# Patient Record
Sex: FEMALE | Race: BLACK OR AFRICAN AMERICAN | NOT HISPANIC OR LATINO | Employment: FULL TIME | URBAN - METROPOLITAN AREA
[De-identification: names, ages, dates, MRNs, and addresses within clinical notes are randomized per-mention and may not be internally consistent; named-entity substitution may affect disease eponyms.]

---

## 2019-12-24 ENCOUNTER — HOSPITAL ENCOUNTER (EMERGENCY)
Facility: HOSPITAL | Age: 29
Discharge: HOME | End: 2019-12-24
Attending: EMERGENCY MEDICINE
Payer: COMMERCIAL

## 2019-12-24 VITALS
DIASTOLIC BLOOD PRESSURE: 92 MMHG | HEIGHT: 66 IN | HEART RATE: 89 BPM | SYSTOLIC BLOOD PRESSURE: 131 MMHG | RESPIRATION RATE: 18 BRPM | BODY MASS INDEX: 29.25 KG/M2 | WEIGHT: 182 LBS | TEMPERATURE: 98 F | OXYGEN SATURATION: 97 %

## 2019-12-24 DIAGNOSIS — M25.562 LEFT KNEE PAIN: ICD-10-CM

## 2019-12-24 DIAGNOSIS — S83.512A RUPTURE OF ANTERIOR CRUCIATE LIGAMENT OF LEFT KNEE, INITIAL ENCOUNTER: Primary | ICD-10-CM

## 2019-12-24 LAB
B-HCG UR QL: NEGATIVE
CTP QC/QA: YES

## 2019-12-24 PROCEDURE — 29505 APPLICATION LONG LEG SPLINT: CPT | Mod: LT

## 2019-12-24 PROCEDURE — 81025 URINE PREGNANCY TEST: CPT | Performed by: NURSE PRACTITIONER

## 2019-12-24 PROCEDURE — 99284 EMERGENCY DEPT VISIT MOD MDM: CPT | Mod: 25

## 2019-12-24 PROCEDURE — 25000003 PHARM REV CODE 250: Performed by: NURSE PRACTITIONER

## 2019-12-24 RX ORDER — OXYCODONE AND ACETAMINOPHEN 5; 325 MG/1; MG/1
1 TABLET ORAL
Status: DISCONTINUED | OUTPATIENT
Start: 2019-12-24 | End: 2019-12-24 | Stop reason: HOSPADM

## 2019-12-24 RX ORDER — IBUPROFEN 600 MG/1
600 TABLET ORAL EVERY 6 HOURS PRN
Qty: 20 TABLET | Refills: 0 | Status: SHIPPED | OUTPATIENT
Start: 2019-12-24

## 2019-12-24 RX ORDER — HYDROCODONE BITARTRATE AND ACETAMINOPHEN 5; 325 MG/1; MG/1
1 TABLET ORAL EVERY 4 HOURS PRN
Qty: 10 TABLET | Refills: 0 | Status: SHIPPED | OUTPATIENT
Start: 2019-12-24

## 2019-12-24 RX ORDER — NAPROXEN 500 MG/1
500 TABLET ORAL
Status: COMPLETED | OUTPATIENT
Start: 2019-12-24 | End: 2019-12-24

## 2019-12-24 RX ADMIN — NAPROXEN 500 MG: 500 TABLET ORAL at 01:12

## 2019-12-24 NOTE — DISCHARGE INSTRUCTIONS
You have been prescribed NORCO for pain. Please do not take this medication while working, drinking alcohol, swimming, or while driving/operating heavy machinery. This medication may cause drowsiness, impair judgment, and reduce physical capabilities.This medication contains Tylenol. Please do not take any additional Tylenol while you are taking this medication.      You have been prescribed ibuprofen for pain. This is an Non-Steroidal Anti-Inflammatory (NSAID) Medication. Please do not take any additional NSAIDs while you are taking this medication including (Advil, Aleve, Motrin, Ibuprofen, Mobic\meloxicam, Naprosyn, etc.). Please stop taking this medication if you experience: weakness, itching, yellow skin or eyes, joint pains, vomiting blood, blood or black stools, unusual weight gain, or swelling in your arms, legs, hands, or feet.     Please return to the Emergency Department for any new or worsening symptoms including: fever, chest pain, shortness of breath, loss of consciousness, dizziness, weakness, or any other concerns.     Please follow up with your Primary Care Provider within in the week. If you do not have one, you may contact the one listed on your discharge paperwork or you may also call the [facility] Appointment Desk at [phone] to schedule an appointment with one.     Please take all medication as prescribed.

## 2019-12-24 NOTE — ED PROVIDER NOTES
Encounter Date: 12/24/2019    SCRIBE #1 NOTE: I, Eliana Moreno, am scribing for, and in the presence of,  Doris Bassett NP. I have scribed the following portions of the note - Other sections scribed: EBONIE ASENCIO.       History     Chief Complaint   Patient presents with    Knee Pain     Pt c/o left knee pain after jumping on a trampoline today. Limited ROM d/t pain      29 year old female patient presents to the ED complaining of left knee pain status post jumping on a trampoline prior to arrival. The patient reports that she was landing from her jump when her left leg gave out on her. She reports that she did not attempt treatment. She denies any prior injuries to her left leg. She denies any significant past medical history. No other associated symptoms.    The history is provided by the patient.     Review of patient's allergies indicates:  No Known Allergies  History reviewed. No pertinent past medical history.  History reviewed. No pertinent surgical history.  History reviewed. No pertinent family history.  Social History     Tobacco Use    Smoking status: Never Smoker    Smokeless tobacco: Never Used   Substance Use Topics    Alcohol use: Yes     Frequency: Never     Comment: occass    Drug use: Never     Review of Systems   Constitutional: Negative for chills and fever.   HENT: Negative for sore throat.    Eyes: Negative for visual disturbance.   Respiratory: Negative for shortness of breath.    Cardiovascular: Negative for chest pain.   Gastrointestinal: Negative for abdominal pain, diarrhea, nausea and vomiting.   Genitourinary: Negative for dysuria.   Musculoskeletal: Positive for arthralgias (Left knee). Negative for neck pain.   Skin: Negative for rash.   Neurological: Negative for headaches.   Psychiatric/Behavioral: Negative for dysphoric mood.       Physical Exam     Initial Vitals [12/24/19 1228]   BP Pulse Resp Temp SpO2   (!) 141/97 98 18 98.9 °F (37.2 °C) 100 %      MAP       --          Physical Exam    Constitutional: She appears well-developed and well-nourished. She is not diaphoretic. No distress.   HENT:   Head: Normocephalic and atraumatic.   Neck: Normal range of motion.   Cardiovascular: Normal rate, regular rhythm, normal heart sounds and intact distal pulses.   Pulmonary/Chest: Breath sounds normal. No respiratory distress.   Musculoskeletal:        Left hip: Normal.        Left knee: She exhibits decreased range of motion and swelling. Tenderness found.        Left ankle: Normal.   Good distal pulses and capillary refill   Neurological: She is alert and oriented to person, place, and time.   Skin: Skin is warm and dry. Capillary refill takes less than 2 seconds.   Psychiatric: She has a normal mood and affect. Her behavior is normal.         ED Course   Procedures  Labs Reviewed   POCT URINE PREGNANCY          Imaging Results          CT Knee Without Contrast Left (Final result)  Result time 12/24/19 16:09:45    Final result by Errol Taylor Jr., MD (12/24/19 16:09:45)                 Impression:      Findings concerning for ACL injury.  Likely some edema posterolateral corner and perhaps a tiny avulsion fracture posterolateral tibial plateau.  There is also soft tissue stranding about the lateral collateral ligament and some fluid along the popliteus tendon.  Joint effusion noted.  Nonemergent MRI and orthopedic consultation suggested.      Electronically signed by: Errol Taylor MD  Date:    12/24/2019  Time:    16:09             Narrative:    EXAMINATION:  CT KNEE WITHOUT CONTRAST LEFT    CLINICAL HISTORY:  Knee trauma, xray tibial plateau fx, bone or soft tissue inj suspected;    TECHNIQUE:  CT of the left knee was performed.  1.25 mm axial as well as 2 mm sagittal and coronal reformatted images submitted.    COMPARISON:  Knee radiographs December 24.    FINDINGS:  The tibia and tibial plateau appear intact.  No convincing fracture seen.  There is a small effusion in the  suprapatellar bursa.  There is some soft tissue stranding in the region of the ACL.  Additionally there is some increased sclerosis posterolateral aspect of the tibia and perhaps a small avulsion at the posterolateral corner.  Additionally there is some soft tissue stranding about the lateral collateral ligament.  Some fluid likely extends along the popliteus tendon.  Findings concerning for ACL tear and associated injuries.  Nonemergent MRI and orthopedic consultation suggested.  Remainder of the bones are intact.  Intradepartmental consultation was obtained.                               X-Ray Knee 3 View Left (Final result)  Result time 12/24/19 14:10:28    Final result by Errol Taylor Jr., MD (12/24/19 14:10:28)                 Impression:      Questionable step-off along the medial tibial plateau.  This may merely be related to some rotation with tibial plateau fracture not excluded.  Correlation with clinical findings and additional images if indicated.      Electronically signed by: Errol Taylor MD  Date:    12/24/2019  Time:    14:10             Narrative:    EXAMINATION:  XR KNEE 3 VIEW LEFT    CLINICAL HISTORY:  Pain in left knee    TECHNIQUE:  AP, lateral, and Merchant views of the left knee were performed.    COMPARISON:  None    FINDINGS:  Bones are well mineralized.  Alignment is satisfactory.  May be a small effusion.  Questionable step-off about the medial tibial plateau.                                 Medical Decision Making:   ED Management:  This is a 29-year-old female presenting for evaluation of left knee pain on exam, there is swelling and generalized tenderness.  Good pulses and capillary refill.  X-ray with questionable step-off along the medial tibial plateau.  Cannot rule out tibial plateau fracture.  CT scan was obtained. Findings concerning for ACL injury with perhaps tiny avulsion fracture of the posterior lateral tibial plateau.  Patient remains neurovascularly intact.  Pain is  controlled.  She was placed in a knee immobilizer and nonweightbearing on crutches.  She is in town visiting from [place].  Patient was given imaging on is disc and instructed follow up as soon as possible with Ortho.  This case was discussed with my attending physician who examined the patient and agrees with my plan of care.            Scribe Attestation:   Scribe #1: I performed the above scribed service and the documentation accurately describes the services I performed. I attest to the accuracy of the note.                          Clinical Impression:       ICD-10-CM ICD-9-CM   1. Rupture of anterior cruciate ligament of left knee, initial encounter S83.512A 844.2   2. Left knee pain M25.562 719.46         Disposition:   Disposition: Discharged  Condition: Stable    SHAMAR ROMANO, personally performed the services described in this documentation. All medical record entries made by the scribe were at my direction and in my presence.  I have reviewed the chart and agree that the record reflects my personal performance and is accurate and complete.                 Doris Bassett, CHERRI  12/24/19 [id number]

## 2019-12-24 NOTE — ED TRIAGE NOTES
Pt. Reports she was jumping on the trampoline and landed on her feet. Pt. Reports left knee pain and reports she heard a pop. Pt. States bending the knee makes the pain worst. Pt. States pain is 10/10 and reports she did not take any OTC prior to coming to the ED.